# Patient Record
Sex: FEMALE | Race: OTHER | NOT HISPANIC OR LATINO | URBAN - METROPOLITAN AREA
[De-identification: names, ages, dates, MRNs, and addresses within clinical notes are randomized per-mention and may not be internally consistent; named-entity substitution may affect disease eponyms.]

---

## 2020-10-01 ENCOUNTER — OUTPATIENT (OUTPATIENT)
Dept: OUTPATIENT SERVICES | Facility: HOSPITAL | Age: 40
LOS: 1 days | End: 2020-10-01
Payer: COMMERCIAL

## 2020-10-01 VITALS
TEMPERATURE: 99 F | DIASTOLIC BLOOD PRESSURE: 67 MMHG | WEIGHT: 160.94 LBS | OXYGEN SATURATION: 100 % | HEIGHT: 66 IN | HEART RATE: 72 BPM | RESPIRATION RATE: 14 BRPM | SYSTOLIC BLOOD PRESSURE: 113 MMHG

## 2020-10-01 DIAGNOSIS — N84.0 POLYP OF CORPUS UTERI: ICD-10-CM

## 2020-10-01 DIAGNOSIS — Z01.818 ENCOUNTER FOR OTHER PREPROCEDURAL EXAMINATION: ICD-10-CM

## 2020-10-01 PROBLEM — Z00.00 ENCOUNTER FOR PREVENTIVE HEALTH EXAMINATION: Status: ACTIVE | Noted: 2020-10-01

## 2020-10-01 LAB
HCG SERPL-ACNC: <1 MIU/ML — SIGNIFICANT CHANGE UP
HCT VFR BLD CALC: 42 % — SIGNIFICANT CHANGE UP (ref 34.5–45)
HGB BLD-MCNC: 14.2 G/DL — SIGNIFICANT CHANGE UP (ref 11.5–15.5)
MCHC RBC-ENTMCNC: 28.7 PG — SIGNIFICANT CHANGE UP (ref 27–34)
MCHC RBC-ENTMCNC: 33.8 GM/DL — SIGNIFICANT CHANGE UP (ref 32–36)
MCV RBC AUTO: 85 FL — SIGNIFICANT CHANGE UP (ref 80–100)
NRBC # BLD: 0 /100 WBCS — SIGNIFICANT CHANGE UP (ref 0–0)
PLATELET # BLD AUTO: 357 K/UL — SIGNIFICANT CHANGE UP (ref 150–400)
RBC # BLD: 4.94 M/UL — SIGNIFICANT CHANGE UP (ref 3.8–5.2)
RBC # FLD: 12.3 % — SIGNIFICANT CHANGE UP (ref 10.3–14.5)
WBC # BLD: 8.35 K/UL — SIGNIFICANT CHANGE UP (ref 3.8–10.5)
WBC # FLD AUTO: 8.35 K/UL — SIGNIFICANT CHANGE UP (ref 3.8–10.5)

## 2020-10-01 PROCEDURE — 36415 COLL VENOUS BLD VENIPUNCTURE: CPT

## 2020-10-01 PROCEDURE — 85027 COMPLETE CBC AUTOMATED: CPT

## 2020-10-01 PROCEDURE — 86850 RBC ANTIBODY SCREEN: CPT

## 2020-10-01 PROCEDURE — 84702 CHORIONIC GONADOTROPIN TEST: CPT

## 2020-10-01 PROCEDURE — G0463: CPT

## 2020-10-01 PROCEDURE — 86901 BLOOD TYPING SEROLOGIC RH(D): CPT

## 2020-10-01 PROCEDURE — 86900 BLOOD TYPING SEROLOGIC ABO: CPT

## 2020-10-01 RX ORDER — SODIUM CHLORIDE 9 MG/ML
1000 INJECTION, SOLUTION INTRAVENOUS
Refills: 0 | Status: DISCONTINUED | OUTPATIENT
Start: 2020-10-05 | End: 2020-10-19

## 2020-10-01 NOTE — H&P PST ADULT - NSICDXPROBLEM_GEN_ALL_CORE_FT
PROBLEM DIAGNOSES  Problem: Polyp of corpus uteri  Assessment and Plan: Preop instructions provided including npo status, aware to stop any NSAIDS, OTC herbals or MVI on 10/1/20. Verbilized understanding. BW done, pending results. DVT prophylasix as per primary team. Aware to f/u on covid testing prior to sx as per pst protocol and form w/ contact for appt provided for 10/2/20.

## 2020-10-01 NOTE — H&P PST ADULT - RS GEN PE MLT RESP DETAILS PC
good air movement/respirations non-labored/clear to auscultation bilaterally/breath sounds equal/no intercostal retractions/no rales/no chest wall tenderness/no subcutaneous emphysema/airway patent/no wheezes

## 2020-10-01 NOTE — H&P PST ADULT - NSANTHOSAYNRD_GEN_A_CORE
No. GISSELLE screening performed.  STOP BANG Legend: 0-2 = LOW Risk; 3-4 = INTERMEDIATE Risk; 5-8 = HIGH Risk/Denies sleep studies done in the past

## 2020-10-01 NOTE — H&P PST ADULT - NEGATIVE CARDIOVASCULAR SYMPTOMS
no orthopnea/no palpitations/no chest pain/no peripheral edema/no dyspnea on exertion/no paroxysmal nocturnal dyspnea/no claudication

## 2020-10-01 NOTE — H&P PST ADULT - HISTORY OF PRESENT ILLNESS
41 y/o F presents to PST w/ a preop dx of polyp of corpus uteri and to be evaluated for a scheduled D&C hysteroscopy polypectomy on 10/5/20. pt states went for an annual GYN eval, us done and a cervical polyp noted so recommended D&C at this time. Pt has a hx of polypectomy in the past. Pt attempting to become pregnant as well.

## 2020-10-02 ENCOUNTER — APPOINTMENT (OUTPATIENT)
Dept: DISASTER EMERGENCY | Facility: CLINIC | Age: 40
End: 2020-10-02

## 2020-10-02 RX ORDER — OXYCODONE HYDROCHLORIDE 5 MG/1
5 TABLET ORAL ONCE
Refills: 0 | Status: DISCONTINUED | OUTPATIENT
Start: 2020-10-05 | End: 2020-10-09

## 2020-10-02 RX ORDER — SODIUM CHLORIDE 9 MG/ML
1000 INJECTION, SOLUTION INTRAVENOUS
Refills: 0 | Status: DISCONTINUED | OUTPATIENT
Start: 2020-10-05 | End: 2020-10-19

## 2020-10-02 RX ORDER — HYDROMORPHONE HYDROCHLORIDE 2 MG/ML
0.5 INJECTION INTRAMUSCULAR; INTRAVENOUS; SUBCUTANEOUS
Refills: 0 | Status: DISCONTINUED | OUTPATIENT
Start: 2020-10-05 | End: 2020-10-09

## 2020-10-02 NOTE — ASU PATIENT PROFILE, ADULT - ABILITY TO HEAR (WITH HEARING AID OR HEARING APPLIANCE IF NORMALLY USED):
· Suspect this was due to NSAIDs and hypotension/sepsis  · Creatinine has completely normalized after IV fluids      · Will continue to avoid NSAIDs  · Can decrease IVF  · Would not resume norvasc yet Adequate: hears normal conversation without difficulty

## 2020-10-03 LAB — SARS-COV-2 N GENE NPH QL NAA+PROBE: NOT DETECTED

## 2020-10-04 ENCOUNTER — TRANSCRIPTION ENCOUNTER (OUTPATIENT)
Age: 40
End: 2020-10-04

## 2020-10-05 ENCOUNTER — RESULT REVIEW (OUTPATIENT)
Age: 40
End: 2020-10-05

## 2020-10-05 ENCOUNTER — OUTPATIENT (OUTPATIENT)
Dept: OUTPATIENT SERVICES | Facility: HOSPITAL | Age: 40
LOS: 1 days | End: 2020-10-05
Payer: COMMERCIAL

## 2020-10-05 VITALS
OXYGEN SATURATION: 100 % | DIASTOLIC BLOOD PRESSURE: 76 MMHG | HEART RATE: 96 BPM | TEMPERATURE: 98 F | WEIGHT: 160.94 LBS | RESPIRATION RATE: 16 BRPM | SYSTOLIC BLOOD PRESSURE: 136 MMHG | HEIGHT: 66 IN

## 2020-10-05 VITALS
SYSTOLIC BLOOD PRESSURE: 100 MMHG | RESPIRATION RATE: 16 BRPM | DIASTOLIC BLOOD PRESSURE: 61 MMHG | HEART RATE: 60 BPM | OXYGEN SATURATION: 98 %

## 2020-10-05 DIAGNOSIS — Z01.818 ENCOUNTER FOR OTHER PREPROCEDURAL EXAMINATION: ICD-10-CM

## 2020-10-05 DIAGNOSIS — N84.0 POLYP OF CORPUS UTERI: ICD-10-CM

## 2020-10-05 PROCEDURE — 88305 TISSUE EXAM BY PATHOLOGIST: CPT | Mod: 26

## 2020-10-05 PROCEDURE — 88305 TISSUE EXAM BY PATHOLOGIST: CPT

## 2020-10-05 PROCEDURE — 58558 HYSTEROSCOPY BIOPSY: CPT

## 2020-10-05 RX ORDER — UBIDECARENONE 100 MG
1 CAPSULE ORAL
Qty: 0 | Refills: 0 | DISCHARGE

## 2020-10-05 RX ORDER — ACETAMINOPHEN 500 MG
1000 TABLET ORAL ONCE
Refills: 0 | Status: COMPLETED | OUTPATIENT
Start: 2020-10-05 | End: 2020-10-05

## 2020-10-05 RX ORDER — ACETAMINOPHEN 500 MG
975 TABLET ORAL ONCE
Refills: 0 | Status: COMPLETED | OUTPATIENT
Start: 2020-10-05 | End: 2020-10-05

## 2020-10-05 RX ORDER — ONDANSETRON 8 MG/1
4 TABLET, FILM COATED ORAL ONCE
Refills: 0 | Status: COMPLETED | OUTPATIENT
Start: 2020-10-05 | End: 2020-10-05

## 2020-10-05 RX ORDER — CHOLECALCIFEROL (VITAMIN D3) 125 MCG
1 CAPSULE ORAL
Qty: 0 | Refills: 0 | DISCHARGE

## 2020-10-05 RX ADMIN — Medication 400 MILLIGRAM(S): at 11:03

## 2020-10-05 RX ADMIN — SODIUM CHLORIDE 30 MILLILITER(S): 9 INJECTION, SOLUTION INTRAVENOUS at 10:28

## 2020-10-05 RX ADMIN — ONDANSETRON 4 MILLIGRAM(S): 8 TABLET, FILM COATED ORAL at 11:00

## 2020-10-05 RX ADMIN — SODIUM CHLORIDE 75 MILLILITER(S): 9 INJECTION, SOLUTION INTRAVENOUS at 07:44

## 2020-10-05 NOTE — ASU DISCHARGE PLAN (ADULT/PEDIATRIC) - CARE PROVIDER_API CALL
Keyona Villa  OBSTETRICS AND GYNECOLOGY  [address] [facility], [address] [place] [address]  Phone: [phone]  Fax: [phone]  Established Patient  Follow Up Time: 2 weeks

## 2020-10-05 NOTE — BRIEF OPERATIVE NOTE - NSICDXBRIEFPREOP_GEN_ALL_CORE_FT
PRE-OP DIAGNOSIS:  Endocervical polyp 05-Oct-2020 10:35:05  Keyona Owen  Menorrhagia 05-Oct-2020 10:33:52  Keyona Owen

## 2020-10-05 NOTE — BRIEF OPERATIVE NOTE - NSICDXBRIEFPOSTOP_GEN_ALL_CORE_FT
POST-OP DIAGNOSIS:  Menorrhagia 05-Oct-2020 10:35:28  Keyona Owen  Endocervical polyp 05-Oct-2020 10:35:16  Keyona Owen

## 2020-10-05 NOTE — BRIEF OPERATIVE NOTE - NSICDXBRIEFPROCEDURE_GEN_ALL_CORE_FT
PROCEDURES:  Hysteroscopy, with dilation and fractional curettage of uterus 05-Oct-2020 10:34:30  Keyona Owen  Endocervical polypectomy 05-Oct-2020 10:33:18 operative hysteroscopy Keyona Owen

## 2020-10-05 NOTE — BRIEF OPERATIVE NOTE - OPERATION/FINDINGS
EUA: Normal sized retroverted uterus. 2.5 cm endocervical polyp. Normal appearing endometrial cavity.

## 2020-10-22 ENCOUNTER — TRANSCRIPTION ENCOUNTER (OUTPATIENT)
Age: 40
End: 2020-10-22

## 2021-09-15 NOTE — ASU PATIENT PROFILE, ADULT - BLOOD TRANSFUSION, PREVIOUS, PROFILE
Detail Level: Detailed Detail Level: Generalized Detail Level: Zone Sunscreen Recommendations: 50 SPF+, sun protective clothing no

## 2022-02-09 PROBLEM — Z78.9 OTHER SPECIFIED HEALTH STATUS: Chronic | Status: ACTIVE | Noted: 2020-10-01

## 2022-07-11 ENCOUNTER — APPOINTMENT (OUTPATIENT)
Dept: RHEUMATOLOGY | Facility: CLINIC | Age: 42
End: 2022-07-11